# Patient Record
Sex: FEMALE | URBAN - METROPOLITAN AREA
[De-identification: names, ages, dates, MRNs, and addresses within clinical notes are randomized per-mention and may not be internally consistent; named-entity substitution may affect disease eponyms.]

---

## 2020-06-01 ENCOUNTER — TELEPHONE (OUTPATIENT)
Dept: OBSTETRICS AND GYNECOLOGY | Facility: CLINIC | Age: 20
End: 2020-06-01

## 2020-06-01 DIAGNOSIS — R79.89 LOW VITAMIN D LEVEL: ICD-10-CM

## 2020-06-01 DIAGNOSIS — B37.31 YEAST VAGINITIS: Primary | ICD-10-CM

## 2020-06-01 DIAGNOSIS — N91.1 AMENORRHEA, SECONDARY: ICD-10-CM

## 2020-06-01 DIAGNOSIS — E28.2 PCOS (POLYCYSTIC OVARIAN SYNDROME): ICD-10-CM

## 2020-06-01 RX ORDER — FLUCONAZOLE 150 MG/1
150 TABLET ORAL
Qty: 2 TABLET | Refills: 0 | Status: SHIPPED | OUTPATIENT
Start: 2020-06-01 | End: 2020-06-05

## 2020-06-01 RX ORDER — BUPROPION HYDROCHLORIDE 150 MG/1
TABLET ORAL
COMMUNITY
Start: 2020-03-08

## 2020-06-01 NOTE — TELEPHONE ENCOUNTER
----- Message from Zuly King sent at 6/1/2020  3:33 PM CDT -----  Contact: patient  Would like to consult with nurse regarding some concerns she has about her cycle and the vaginal area. Please call back at [phone]

## 2020-06-01 NOTE — TELEPHONE ENCOUNTER
rx sent and will keep in my basket to send that referral in am when we can get info from that office

## 2020-06-01 NOTE — TELEPHONE ENCOUNTER
Pt c/o yeast infection that she treated with otc monistat 1. Not completely gone. Would like rx for oral med.NKDA. Uses [facility] in [place]. Also wants referral to Dr. Guo for fertility tx, states has PCOS and has not had cycle since February.several negative upts. Fwd to Dr. Maria. Verbalized understanding.

## 2020-06-03 NOTE — TELEPHONE ENCOUNTER
Pt advised that lab orders sent to [facility]. Needs to get done before ref. Also partner needs a semen analysis done first. Needs to go through family doctor or Urgent care. Verbalized understanding.

## 2020-06-03 NOTE — TELEPHONE ENCOUNTER
In reviewing her paper chart, she is a 18y/o G0 who  has a history of pcos but her u/s was normal. Her labs (jan 2020) - were significant for elevated testosterone as well as a low vitamin d3 level which she needs to recheck. If she hasnt had a period since she started trying in 2/2020 with negative upts, we probably need to recheck labs (especially testeosterone and vitamin D3). Also cannot send referral to [facility] until her partner does a semen analysis and gets that report for us. He requires that.

## 2020-06-03 NOTE — TELEPHONE ENCOUNTER
[facility]. If she wants to do them elsewhere, they are on printer and can fax elsewhere

## 2020-06-04 LAB
ABS NRBC COUNT: 0 X 10 3/UL (ref 0–0.01)
ABSOLUTE BASOPHIL: 0.03 X 10 3/UL (ref 0–0.22)
ABSOLUTE EOSINOPHIL: 0.11 X 10 3/UL (ref 0.04–0.54)
ABSOLUTE IMMATURE GRAN: 0.02 X 10 3/UL (ref 0–0.04)
ABSOLUTE LYMPHOCYTE: 3 X 10 3/UL (ref 0.86–4.75)
ABSOLUTE MONOCYTE: 0.5 X 10 3/UL (ref 0.22–1.08)
ALBUMIN SERPL-MCNC: 4.5 G/DL (ref 3.5–5.2)
ALBUMIN/GLOB SERPL ELPH: 1.7 {RATIO} (ref 1–2.7)
ALP ISOS SERPL LEV INH-CCNC: 80 U/L (ref 35–105)
ALT (SGPT): 17 U/L (ref 0–33)
ANION GAP SERPL CALC-SCNC: 13 MMOL/L (ref 8–17)
AST SERPL-CCNC: 15 U/L (ref 0–32)
BASOPHILS NFR BLD: 0.4 % (ref 0.2–1.2)
BILIRUBIN, TOTAL: <0.15 MG/DL (ref 0–1.2)
BUN/CREAT SERPL: 20.7 (ref 6–20)
CALCIUM SERPL-MCNC: 9.7 MG/DL (ref 8.6–10.2)
CARBON DIOXIDE, CO2: 26 MMOL/L (ref 22–29)
CHLORIDE: 103 MMOL/L (ref 98–107)
CREAT SERPL-MCNC: 0.72 MG/DL (ref 0.5–0.9)
DHEA SULFATE: 174 UG/DL (ref 148–407)
EOSINOPHIL NFR BLD: 1.6 % (ref 0.7–7)
FREE TESTOSTERONE: 0.8 NG/DL (ref 0–1)
FSH: 5.26 MIU/ML
GFR ESTIMATION: 102.25
GLOBULIN: 2.6 G/DL (ref 1.5–4.5)
GLUCOSE: 96 MG/DL (ref 74–106)
HCT VFR BLD AUTO: 43.6 % (ref 37–47)
HGB BLD-MCNC: 13.6 G/DL (ref 12–16)
IMMATURE GRANULOCYTES: 0.3 % (ref 0–0.5)
INSULIN AB SER QL: 18.1 UIU/ML (ref 2.6–24.9)
LH: 22.2 MIU/ML
LYMPHOCYTES NFR BLD: 43.6 % (ref 19.3–53.1)
Lab: NORMAL
MCH RBC QN AUTO: 27.5 PG (ref 27–32)
MCHC RBC AUTO-ENTMCNC: 31.2 G/DL (ref 32–36)
MCV RBC AUTO: 88.3 FL (ref 82–100)
MONOCYTES NFR BLD: 7.3 % (ref 4.7–12.5)
NEUTROPHILS ABSOLUTE COUNT: 3.22 X 10 3/UL (ref 2.15–7.56)
NEUTROPHILS NFR BLD: 46.8 %
NUCLEATED RED BLOOD CELLS: 0 /100 WBC (ref 0–0.2)
PLATELET # BLD AUTO: 256 X 10 3/UL (ref 135–400)
POTASSIUM: 4.6 MMOL/L (ref 3.5–5.1)
PROT SNV-MCNC: 7.1 G/DL (ref 6.4–8.3)
RBC # BLD AUTO: 4.94 X 10 6/UL (ref 4.2–5.4)
RDW-SD: 41.4 FL (ref 37–54)
SODIUM: 142 MMOL/L (ref 136–145)
T4, FREE: 1.21 NG/DL (ref 0.93–1.7)
TSH SERPL DL<=0.005 MIU/L-ACNC: 3.88 UIU/ML (ref 0.27–4.2)
UREA NITROGEN (BUN): 14.9 MG/DL (ref 6–20)
WBC # BLD: 6.88 X 10 3/UL (ref 4.3–10.8)

## 2020-06-11 LAB
VITAMIN D, 1,25 (OH)2: 31 PG/ML (ref 18–72)
VITAMIN D2, 1,25 (OH)2: <8 PG/ML
VITAMIN D3, 1,25 (OH)2: 31 PG/ML

## 2020-06-14 NOTE — TELEPHONE ENCOUNTER
Please let patient know that her vitamin d3 level is 31 which is in the normal range, but a daily women's multivitamin is always a safe way yo supplement any vitamin deficiences she is not getting from her diet.  Results faxed and to be scanned into chart and linked to order

## 2020-06-15 ENCOUNTER — TELEPHONE (OUTPATIENT)
Dept: OBSTETRICS AND GYNECOLOGY | Facility: CLINIC | Age: 20
End: 2020-06-15

## 2020-06-15 DIAGNOSIS — R79.89 LOW VITAMIN D LEVEL: Primary | ICD-10-CM

## 2020-06-15 RX ORDER — CHOLECALCIFEROL (VITAMIN D3) 625 MCG
1 CAPSULE ORAL
Qty: 12 CAPSULE | Refills: 0 | Status: SHIPPED | OUTPATIENT
Start: 2020-06-15

## 2020-06-15 NOTE — TELEPHONE ENCOUNTER
Received call from patient through the call center. Patient was here earlier, but left without a copy of her lab results. She later called about receiving a copy. When asked exactly what she needed, patient stated never mind, she would go get at copy from the [facility].

## 2020-08-04 ENCOUNTER — TELEPHONE (OUTPATIENT)
Dept: OBSTETRICS AND GYNECOLOGY | Facility: CLINIC | Age: 20
End: 2020-08-04

## 2020-08-04 DIAGNOSIS — R79.89 LOW VITAMIN D LEVEL: Primary | ICD-10-CM

## 2020-08-04 NOTE — TELEPHONE ENCOUNTER
----- Message from Jenni Rabago sent at 8/4/2020  8:53 AM CDT -----  Patient called in regards to let dr. Maria nurse know that she completed  her vitamin d, please call back at [phone].        Thanks,  Jenni Rabago

## 2020-08-04 NOTE — TELEPHONE ENCOUNTER
----- Message from Jenni Rabago sent at 8/4/2020  8:53 AM CDT -----  Patient called in regards to let dr. Maria nurse know that she completed  her vitamin d, please call back at [phone].        Thanks,  Jenni Rabago

## 2020-08-04 NOTE — TELEPHONE ENCOUNTER
Spoke with patient. Two pt identifiers confirmed. Notified patient  Will fax lab order for vit d3 to [facility]. Patient verbalized understanding.

## 2020-08-10 LAB
VITAMIN D, 1,25 (OH)2: 69 PG/ML (ref 18–72)
VITAMIN D2, 1,25 (OH)2: <8 PG/ML
VITAMIN D3, 1,25 (OH)2: 69 PG/ML

## 2020-08-13 ENCOUNTER — TELEPHONE (OUTPATIENT)
Dept: OBSTETRICS AND GYNECOLOGY | Facility: CLINIC | Age: 20
End: 2020-08-13

## 2020-08-13 NOTE — TELEPHONE ENCOUNTER
Please let patient know her vitamin d level is back to normal.  She can continue with a womans multivitamin or if she isnt taking one, she can take 1000 IU a day.

## 2020-08-13 NOTE — TELEPHONE ENCOUNTER
----- Message from Jasmin Maria MD sent at 8/12/2020 11:45 PM CDT -----  Please let patient know her vitamin d level is back to normal.  She can continue with a womans multivitamin or if she isnt taking one, she can take 1000 IU a day.

## 2020-08-13 NOTE — TELEPHONE ENCOUNTER
Spoke with patient. Two pt identifiers confirmed. Notified patient of results of normal vit d level. Patient verbalized understanding.